# Patient Record
Sex: MALE | Employment: OTHER | URBAN - METROPOLITAN AREA
[De-identification: names, ages, dates, MRNs, and addresses within clinical notes are randomized per-mention and may not be internally consistent; named-entity substitution may affect disease eponyms.]

---

## 2021-05-27 ENCOUNTER — OFFICE VISIT (OUTPATIENT)
Dept: PRIMARY CARE CLINIC | Age: 66
End: 2021-05-27
Payer: MEDICARE

## 2021-05-27 VITALS
HEART RATE: 61 BPM | SYSTOLIC BLOOD PRESSURE: 150 MMHG | BODY MASS INDEX: 29.44 KG/M2 | OXYGEN SATURATION: 97 % | HEIGHT: 69 IN | DIASTOLIC BLOOD PRESSURE: 100 MMHG | WEIGHT: 198.8 LBS

## 2021-05-27 DIAGNOSIS — K40.90 RIGHT INGUINAL HERNIA: Primary | ICD-10-CM

## 2021-05-27 DIAGNOSIS — N28.9 RENAL INSUFFICIENCY: ICD-10-CM

## 2021-05-27 PROCEDURE — 99203 OFFICE O/P NEW LOW 30 MIN: CPT | Performed by: FAMILY MEDICINE

## 2021-05-27 PROCEDURE — 4040F PNEUMOC VAC/ADMIN/RCVD: CPT | Performed by: FAMILY MEDICINE

## 2021-05-27 PROCEDURE — 3017F COLORECTAL CA SCREEN DOC REV: CPT | Performed by: FAMILY MEDICINE

## 2021-05-27 PROCEDURE — 1036F TOBACCO NON-USER: CPT | Performed by: FAMILY MEDICINE

## 2021-05-27 PROCEDURE — G8427 DOCREV CUR MEDS BY ELIG CLIN: HCPCS | Performed by: FAMILY MEDICINE

## 2021-05-27 PROCEDURE — G8417 CALC BMI ABV UP PARAM F/U: HCPCS | Performed by: FAMILY MEDICINE

## 2021-05-27 PROCEDURE — 1123F ACP DISCUSS/DSCN MKR DOCD: CPT | Performed by: FAMILY MEDICINE

## 2021-05-27 RX ORDER — CALCIUM ACETATE 667 MG/1
TABLET ORAL
COMMUNITY
Start: 2020-12-09 | End: 2021-12-16

## 2021-05-27 RX ORDER — ASPIRIN 325 MG
TABLET ORAL
COMMUNITY
Start: 2021-03-17

## 2021-05-27 RX ORDER — SODIUM BICARBONATE 325 MG/1
TABLET ORAL
COMMUNITY
End: 2021-12-16

## 2021-05-27 RX ORDER — CHOLECALCIFEROL (VITAMIN D3) 10(400)/ML
0.5 DROPS ORAL
COMMUNITY
Start: 2020-12-21 | End: 2022-02-24

## 2021-05-27 RX ORDER — HEPARIN SODIUM 10000 [USP'U]/ML
INJECTION, SOLUTION INTRAVENOUS; SUBCUTANEOUS
COMMUNITY
Start: 2021-04-09 | End: 2022-04-08

## 2021-05-27 RX ORDER — CARVEDILOL 12.5 MG/1
TABLET ORAL
COMMUNITY
Start: 2020-10-12 | End: 2021-12-16

## 2021-05-27 RX ORDER — CARVEDILOL 6.25 MG/1
TABLET ORAL
COMMUNITY
Start: 2021-03-03

## 2021-05-27 SDOH — ECONOMIC STABILITY: FOOD INSECURITY: WITHIN THE PAST 12 MONTHS, THE FOOD YOU BOUGHT JUST DIDN'T LAST AND YOU DIDN'T HAVE MONEY TO GET MORE.: NEVER TRUE

## 2021-05-27 SDOH — ECONOMIC STABILITY: FOOD INSECURITY: WITHIN THE PAST 12 MONTHS, YOU WORRIED THAT YOUR FOOD WOULD RUN OUT BEFORE YOU GOT MONEY TO BUY MORE.: NEVER TRUE

## 2021-05-27 ASSESSMENT — ENCOUNTER SYMPTOMS
RESPIRATORY NEGATIVE: 1
EYES NEGATIVE: 1

## 2021-05-27 ASSESSMENT — PATIENT HEALTH QUESTIONNAIRE - PHQ9
SUM OF ALL RESPONSES TO PHQ QUESTIONS 1-9: 0
1. LITTLE INTEREST OR PLEASURE IN DOING THINGS: 0
2. FEELING DOWN, DEPRESSED OR HOPELESS: 0
SUM OF ALL RESPONSES TO PHQ9 QUESTIONS 1 & 2: 0

## 2021-05-27 ASSESSMENT — SOCIAL DETERMINANTS OF HEALTH (SDOH): HOW HARD IS IT FOR YOU TO PAY FOR THE VERY BASICS LIKE FOOD, HOUSING, MEDICAL CARE, AND HEATING?: SOMEWHAT HARD

## 2021-05-27 NOTE — PROGRESS NOTES
Subjective:      Patient ID: Lynda Cardona is a 78 y.o. male. Has a large right hernia needs fixed  Has h/o renal failure sees nephrology and is on dialysis  Has cardiology too      Review of Systems   Constitutional: Negative. HENT: Negative. Eyes: Negative. Respiratory: Negative. Cardiovascular:        No records yet of his issues   Genitourinary:        On dialysis with left arm fistula       Objective:   Physical Exam  Constitutional:       Appearance: Normal appearance. HENT:      Nose: Nose normal.   Eyes:      Pupils: Pupils are equal, round, and reactive to light. Pulmonary:      Effort: Pulmonary effort is normal.   Genitourinary:     Comments: Right hernia  Musculoskeletal:         General: Normal range of motion. Cervical back: Normal range of motion. Neurological:      General: No focal deficit present. Mental Status: He is alert. Psychiatric:         Mood and Affect: Mood normal.         Thought Content: Thought content normal.         Judgment: Judgment normal.         Assessment:      1. Right inguinal hernia    2. Renal insufficiency            Plan:      Gloria was seen today for new patient, hypertension and other.     Diagnoses and all orders for this visit:    Right inguinal hernia  -     Ailyn Knox DO, General Surgery, [place]    Renal insufficiency                Electronically signed by Ermias Benedict MD on 5/27/2021 at 1:52 PM

## 2021-06-18 ENCOUNTER — TELEPHONE (OUTPATIENT)
Dept: PRIMARY CARE CLINIC | Age: 66
End: 2021-06-18

## 2021-07-13 ENCOUNTER — OFFICE VISIT (OUTPATIENT)
Dept: SURGERY | Age: 66
End: 2021-07-13
Payer: MEDICARE

## 2021-07-13 VITALS
HEART RATE: 60 BPM | WEIGHT: 195.4 LBS | DIASTOLIC BLOOD PRESSURE: 99 MMHG | HEIGHT: 69 IN | BODY MASS INDEX: 28.94 KG/M2 | OXYGEN SATURATION: 99 % | SYSTOLIC BLOOD PRESSURE: 166 MMHG

## 2021-07-13 DIAGNOSIS — D47.09 MAST CELL DISORDER: ICD-10-CM

## 2021-07-13 DIAGNOSIS — K40.90 RIGHT INGUINAL HERNIA: Primary | ICD-10-CM

## 2021-07-13 DIAGNOSIS — N18.6 ESRD (END STAGE RENAL DISEASE) ON DIALYSIS (HCC): ICD-10-CM

## 2021-07-13 DIAGNOSIS — Z99.2 ESRD (END STAGE RENAL DISEASE) ON DIALYSIS (HCC): ICD-10-CM

## 2021-07-13 PROCEDURE — 1036F TOBACCO NON-USER: CPT | Performed by: SURGERY

## 2021-07-13 PROCEDURE — G8427 DOCREV CUR MEDS BY ELIG CLIN: HCPCS | Performed by: SURGERY

## 2021-07-13 PROCEDURE — 99203 OFFICE O/P NEW LOW 30 MIN: CPT | Performed by: SURGERY

## 2021-07-13 PROCEDURE — 1123F ACP DISCUSS/DSCN MKR DOCD: CPT | Performed by: SURGERY

## 2021-07-13 PROCEDURE — 3017F COLORECTAL CA SCREEN DOC REV: CPT | Performed by: SURGERY

## 2021-07-13 PROCEDURE — G8417 CALC BMI ABV UP PARAM F/U: HCPCS | Performed by: SURGERY

## 2021-07-13 PROCEDURE — 4040F PNEUMOC VAC/ADMIN/RCVD: CPT | Performed by: SURGERY

## 2021-07-13 NOTE — PROGRESS NOTES
[id number] [address] [facility] Surgery   History & Physical  Yelena Hayward,   Pt Name: Etelvina Cardona  MRN: [id number]  [place]: 1955  Date of evaluation: 7/13/2021  Primary Care Physician: Mark Raman MD    Chief Complaint: right inguinal hernia      SUBJECTIVE:    History of Present Illness: This is a 78 y.o.  male who presents for evaluation for right inguinal hernia, present for ~1yr, slowly growing in size since then, denies testicular pain or symptoms of obstruction, no history of prior repair. PMH is significant for CKD on HT, pt has a history of mast cell disorder but no recent hypersensitivity reactions (pt does not go to [facility] due to the age of the building and allergens present). Pt reports hernia is nontender, spontaneously reduces when supine. Chart review performed to add information to the HPI: Yes    Past Medical History   has a past medical history of Asthma, Chronic kidney disease, and Hypertension. Past Surgical History   has a past surgical history that includes Tonsillectomy; Vasectomy; Dialysis fistula creation; and Prostate surgery. Family History  family history includes Cancer in his father and mother; High Blood Pressure in his mother. Social History  Tobacco use:  reports that he has never smoked. He has never used smokeless tobacco.  Alcohol use:  reports current alcohol use. Drug use:  reports no history of drug use.       Medications  Current Medications:   Current Outpatient Medications   Medication Sig Dispense Refill    sodium chloride 0.9 % SOLN 100 mL with iron sucrose 20 MG/ML SOLN 100 mg      Soap & Cleansers (GRANDPAS BAKING SODA SOAP) BAR baking soda      carvedilol (COREG) 12.5 MG tablet Take by mouth      Heparin Sodium, Porcine, (HEPARIN, PORCINE,) 61934 UNIT/ML irrigation Heparin Sodium (Porcine) 1,000 Units/mL Systemic      sodium bicarbonate 325 MG tablet sodium bicarbonate   1300mg take one tablet twice daily      Cholecalciferol (VITAMIN D) 10 MCG/ML LIQD Take 0.5 mcg by mouth      aspirin 325 MG tablet aspirin 325 mg tablet   Take 1 tablet every day by oral route for 30 days.  calcium acetate (PHOSLO) 667 MG TABS calcium acetate(phosphate binders) 667 mg tablet   Take 2 tablets 3 times a day by oral route.  Calcium Citrate-Vitamin D 200-250 MG-UNIT TABS Take by mouth      vitamin D 25 MCG (1000 UT) CAPS Take 1,000 Units by mouth daily      carvedilol (COREG) 6.25 MG tablet carvedilol 6.25 mg tablet   TAKE ONE TABLET BY MOUTH TWO TIMES A DAY (Patient not taking: Reported on 5/27/2021)       No current facility-administered medications for this visit. Home Medications:   Prior to Admission medications    Medication Sig Start Date End Date Taking? Authorizing Provider   sodium chloride 0.9 % SOLN 100 mL with iron sucrose 20 MG/ML SOLN 100 mg 7/7/21 7/28/21 Yes Historical Provider, MD   Soap & Cleansers (GRANDPAS BAKING SODA SOAP) BAR baking soda 10/29/20  Yes Historical Provider, MD   carvedilol (COREG) 12.5 MG tablet Take by mouth 10/12/20  Yes Historical Provider, MD   Heparin Sodium, Porcine, (HEPARIN, PORCINE,) 52367 UNIT/ML irrigation Heparin Sodium (Porcine) 1,000 Units/mL Systemic 4/9/21 4/8/22 Yes Historical Provider, MD   sodium bicarbonate 325 MG tablet sodium bicarbonate   1300mg take one tablet twice daily   Yes Historical Provider, MD   Cholecalciferol (VITAMIN D) 10 MCG/ML LIQD Take 0.5 mcg by mouth 12/21/20 12/20/21 Yes Historical Provider, MD   aspirin 325 MG tablet aspirin 325 mg tablet   Take 1 tablet every day by oral route for 30 days. 3/17/21  Yes Historical Provider, MD   calcium acetate (PHOSLO) 667 MG TABS calcium acetate(phosphate binders) 667 mg tablet   Take 2 tablets 3 times a day by oral route.  12/9/20  Yes Historical Provider, MD   Calcium Citrate-Vitamin D 200-250 MG-UNIT TABS Take by mouth 10/12/20  Yes Historical Provider, MD   vitamin D 25 MCG (1000 UT) CAPS Take 1,000 Units by mouth daily   Yes masses. Large R inguinal bulge that extends to at least the superior portion of the scrotum, bilateral testes intact, no overlying skin changes, reducible. Musculoskeletal: No evidence of bony/muscular deformities, trauma, atrophy of either left/right upper/lower extremity. No evidence of digital clubbing or cyanosis. Neurologic:  CN 2-12 grossly intact without obvious deficits. Grossly normal sensation in all extremities. Psychiatric: appropriate judgement and insight, appropriate recall of recent and remote memory, no evidence of depression/anxiety/agitation    DIAGNOSES:   Diagnosis Orders   1. Right inguinal hernia     2. ESRD (end stage renal disease) on dialysis ([facility] [address]     3. Mast cell disorder         PLAN:  · We discussed several treatment options including nonoperative management vs operative repair (open vs laparoscopic). Given pt's significant comorbidities, will need to risk stratify the patient in order to administer general anesthetics. If high risk then would consider open repair under minimal sedation and local analgesia. Nonoperative observation is acceptable at this point since pt is asymptomatic. Pt would like to some time to consider his options, he would like to have elective repair sometime in October should he choose surgery. · Will obtain medical/renal clearance in the meantime, plan on having Mr Gabby Randall RTC in October to re-evaluate. We discussed the signs/symptoms of hernia incarceration/strangulation in which case pt should proceed directly to the emergency room   · All questions were answered, pt is agreeable to this plan.   ·       Medical Decision Making: low complexity     Electronically signed by Magnolia Joe DO on 7/13/2021 at 10:06 AM

## 2021-09-21 ENCOUNTER — OFFICE VISIT (OUTPATIENT)
Dept: PRIMARY CARE CLINIC | Age: 66
End: 2021-09-21
Payer: MEDICARE

## 2021-09-21 VITALS
BODY MASS INDEX: 28.58 KG/M2 | DIASTOLIC BLOOD PRESSURE: 88 MMHG | WEIGHT: 193 LBS | HEART RATE: 60 BPM | OXYGEN SATURATION: 97 % | SYSTOLIC BLOOD PRESSURE: 136 MMHG | HEIGHT: 69 IN

## 2021-09-21 DIAGNOSIS — J45.909 REACTIVE AIRWAY DISEASE WITHOUT COMPLICATION, UNSPECIFIED ASTHMA SEVERITY, UNSPECIFIED WHETHER PERSISTENT: Primary | ICD-10-CM

## 2021-09-21 PROCEDURE — 3017F COLORECTAL CA SCREEN DOC REV: CPT | Performed by: FAMILY MEDICINE

## 2021-09-21 PROCEDURE — G8427 DOCREV CUR MEDS BY ELIG CLIN: HCPCS | Performed by: FAMILY MEDICINE

## 2021-09-21 PROCEDURE — 1036F TOBACCO NON-USER: CPT | Performed by: FAMILY MEDICINE

## 2021-09-21 PROCEDURE — G8417 CALC BMI ABV UP PARAM F/U: HCPCS | Performed by: FAMILY MEDICINE

## 2021-09-21 PROCEDURE — 4040F PNEUMOC VAC/ADMIN/RCVD: CPT | Performed by: FAMILY MEDICINE

## 2021-09-21 PROCEDURE — 99213 OFFICE O/P EST LOW 20 MIN: CPT | Performed by: FAMILY MEDICINE

## 2021-09-21 PROCEDURE — 1123F ACP DISCUSS/DSCN MKR DOCD: CPT | Performed by: FAMILY MEDICINE

## 2021-09-21 ASSESSMENT — ENCOUNTER SYMPTOMS: CHEST TIGHTNESS: 1

## 2021-09-21 NOTE — PROGRESS NOTES
Subjective:      Patient ID: August Aditya is a 78 y.o. male. longterm lung issues, has been to allergist and pulm  Lots tried nothing helps  Wants to try anything differrent      Review of Systems   HENT: Positive for congestion. Respiratory: Positive for chest tightness. Cardiovascular: Negative. Gastrointestinal:        H/o hernia   Neurological: Negative. Hematological: Negative. Psychiatric/Behavioral: Negative. All other systems reviewed and are negative. Objective:   Physical Exam  Vitals reviewed. Constitutional:       Appearance: Normal appearance. HENT:      Nose: Congestion present. Eyes:      Pupils: Pupils are equal, round, and reactive to light. Cardiovascular:      Rate and Rhythm: Normal rate and regular rhythm. Pulmonary:      Effort: Pulmonary effort is normal.   Musculoskeletal:         General: Normal range of motion. Cervical back: Normal range of motion. Skin:     General: Skin is warm. Neurological:      General: No focal deficit present. Mental Status: He is alert. Psychiatric:         Mood and Affect: Mood normal.         Assessment:      1. Reactive airway disease without complication, unspecified asthma severity, unspecified whether persistent            Plan:      Savannah Youssef was seen today for follow-up, congestion and cough. Diagnoses and all orders for this visit:    Reactive airway disease without complication, unspecified asthma severity, unspecified whether persistent    Other orders  -     ipratropium (ATROVENT HFA) 17 MCG/ACT inhaler;  Inhale 1 puff into the lungs 3 times daily    he did not recall ever having this med            Electronically signed by Kevin Park MD on 9/21/2021 at 10:34 AM

## 2021-11-05 ENCOUNTER — OFFICE VISIT (OUTPATIENT)
Dept: PRIMARY CARE CLINIC | Age: 66
End: 2021-11-05
Payer: MEDICARE

## 2021-11-05 VITALS
HEART RATE: 85 BPM | SYSTOLIC BLOOD PRESSURE: 182 MMHG | OXYGEN SATURATION: 98 % | WEIGHT: 196 LBS | BODY MASS INDEX: 29.03 KG/M2 | DIASTOLIC BLOOD PRESSURE: 120 MMHG | HEIGHT: 69 IN

## 2021-11-05 DIAGNOSIS — I10 HYPERTENSION, UNSPECIFIED TYPE: Primary | ICD-10-CM

## 2021-11-05 PROCEDURE — 99213 OFFICE O/P EST LOW 20 MIN: CPT | Performed by: FAMILY MEDICINE

## 2021-11-05 PROCEDURE — 1123F ACP DISCUSS/DSCN MKR DOCD: CPT | Performed by: FAMILY MEDICINE

## 2021-11-05 PROCEDURE — 4040F PNEUMOC VAC/ADMIN/RCVD: CPT | Performed by: FAMILY MEDICINE

## 2021-11-05 PROCEDURE — 3017F COLORECTAL CA SCREEN DOC REV: CPT | Performed by: FAMILY MEDICINE

## 2021-11-05 PROCEDURE — 1036F TOBACCO NON-USER: CPT | Performed by: FAMILY MEDICINE

## 2021-11-05 PROCEDURE — G8427 DOCREV CUR MEDS BY ELIG CLIN: HCPCS | Performed by: FAMILY MEDICINE

## 2021-11-05 PROCEDURE — G8417 CALC BMI ABV UP PARAM F/U: HCPCS | Performed by: FAMILY MEDICINE

## 2021-11-05 PROCEDURE — G8484 FLU IMMUNIZE NO ADMIN: HCPCS | Performed by: FAMILY MEDICINE

## 2021-11-05 RX ORDER — SODIUM BICARBONATE 650 MG/1
TABLET ORAL
COMMUNITY
Start: 2021-10-27

## 2021-11-09 ENCOUNTER — TELEPHONE (OUTPATIENT)
Dept: PRIMARY CARE CLINIC | Age: 66
End: 2021-11-09

## 2021-11-09 NOTE — TELEPHONE ENCOUNTER
Pt recently had appt with Dr. Bre Quiñonez and doctor was wanting to know past vanco doses       vanco:    11/30/17  1000 mg     12/2/17 1000 mg

## 2021-12-16 ENCOUNTER — OFFICE VISIT (OUTPATIENT)
Dept: PRIMARY CARE CLINIC | Age: 66
End: 2021-12-16
Payer: MEDICARE

## 2021-12-16 VITALS
BODY MASS INDEX: 29.03 KG/M2 | OXYGEN SATURATION: 100 % | WEIGHT: 196 LBS | SYSTOLIC BLOOD PRESSURE: 168 MMHG | HEIGHT: 69 IN | DIASTOLIC BLOOD PRESSURE: 80 MMHG | HEART RATE: 36 BPM

## 2021-12-16 DIAGNOSIS — J40 BRONCHITIS: Primary | ICD-10-CM

## 2021-12-16 PROBLEM — R79.89 ELEVATED TROPONIN I LEVEL: Status: ACTIVE | Noted: 2021-12-16

## 2021-12-16 PROBLEM — N18.9 ANEMIA IN CHRONIC KIDNEY DISEASE: Status: ACTIVE | Noted: 2020-09-28

## 2021-12-16 PROBLEM — R00.2 PALPITATIONS: Status: ACTIVE | Noted: 2021-12-16

## 2021-12-16 PROBLEM — N41.8: Status: ACTIVE | Noted: 2017-05-03

## 2021-12-16 PROBLEM — D47.09 OTHER MAST CELL NEOPLASMS OF UNCERTAIN BEHAVIOR: Status: ACTIVE | Noted: 2018-06-29

## 2021-12-16 PROBLEM — E78.00 PURE HYPERCHOLESTEROLEMIA, UNSPECIFIED: Status: ACTIVE | Noted: 2018-06-29

## 2021-12-16 PROBLEM — S81.802A UNSPECIFIED OPEN WOUND, LEFT LOWER LEG, INITIAL ENCOUNTER: Status: ACTIVE | Noted: 2017-06-29

## 2021-12-16 PROBLEM — N18.6 STAGE 5 CHRONIC KIDNEY DISEASE ON CHRONIC DIALYSIS (HCC): Status: ACTIVE | Noted: 2020-03-03

## 2021-12-16 PROBLEM — I50.9 HEART FAILURE, UNSPECIFIED (HCC): Status: ACTIVE | Noted: 2018-06-29

## 2021-12-16 PROBLEM — T82.9XXA COMPLICATION OF VASCULAR DIALYSIS CATHETER: Status: ACTIVE | Noted: 2017-05-03

## 2021-12-16 PROBLEM — R06.02 SHORTNESS OF BREATH: Status: ACTIVE | Noted: 2017-05-03

## 2021-12-16 PROBLEM — Z99.2 STAGE 5 CHRONIC KIDNEY DISEASE ON CHRONIC DIALYSIS (HCC): Status: ACTIVE | Noted: 2020-03-03

## 2021-12-16 PROBLEM — N39.0 URINARY TRACT INFECTION, SITE NOT SPECIFIED: Status: ACTIVE | Noted: 2021-12-13

## 2021-12-16 PROBLEM — D63.1 ANEMIA IN CHRONIC KIDNEY DISEASE: Status: ACTIVE | Noted: 2020-09-28

## 2021-12-16 PROBLEM — R53.83 OTHER FATIGUE: Status: ACTIVE | Noted: 2018-07-24

## 2021-12-16 PROBLEM — R07.9 CHEST PAIN: Status: ACTIVE | Noted: 2017-05-03

## 2021-12-16 PROBLEM — E83.50 UNSPECIFIED DISORDER OF CALCIUM METABOLISM: Status: ACTIVE | Noted: 2017-10-20

## 2021-12-16 PROBLEM — T88.6XXA ANAPHYLACTIC REACTION DUE TO ADVERSE EFFECT OF CORRECT DRUG OR MEDICAMENT PROPERLY ADMINISTERED, INITIAL ENCOUNTER: Status: ACTIVE | Noted: 2020-10-05

## 2021-12-16 PROBLEM — E87.70 FLUID OVERLOAD, UNSPECIFIED: Status: ACTIVE | Noted: 2017-05-03

## 2021-12-16 PROBLEM — R52 PAIN, UNSPECIFIED: Status: ACTIVE | Noted: 2017-05-03

## 2021-12-16 PROBLEM — R77.8 ELEVATED TROPONIN I LEVEL: Status: ACTIVE | Noted: 2021-12-16

## 2021-12-16 PROBLEM — D68.9 COAGULATION DEFECT, UNSPECIFIED (HCC): Status: ACTIVE | Noted: 2017-05-03

## 2021-12-16 PROBLEM — E88.9 METABOLIC DISORDER, UNSPECIFIED: Status: ACTIVE | Noted: 2017-05-03

## 2021-12-16 PROBLEM — N25.81 SECONDARY HYPERPARATHYROIDISM OF RENAL ORIGIN (HCC): Status: ACTIVE | Noted: 2017-05-03

## 2021-12-16 PROBLEM — J45.909 UNSPECIFIED ASTHMA, UNCOMPLICATED: Status: ACTIVE | Noted: 2018-06-29

## 2021-12-16 PROBLEM — I15.9 SECONDARY HYPERTENSION, UNSPECIFIED: Status: ACTIVE | Noted: 2018-06-29

## 2021-12-16 PROBLEM — I48.92 ATRIAL FLUTTER (HCC): Status: ACTIVE | Noted: 2021-12-16

## 2021-12-16 PROBLEM — I87.1 COMPRESSION OF VEIN: Status: ACTIVE | Noted: 2018-06-29

## 2021-12-16 PROBLEM — D50.9 IRON DEFICIENCY ANEMIA, UNSPECIFIED: Status: ACTIVE | Noted: 2017-05-03

## 2021-12-16 PROBLEM — E87.5 HYPERKALEMIA: Status: ACTIVE | Noted: 2017-05-03

## 2021-12-16 PROBLEM — R50.9 FEVER, UNSPECIFIED: Status: ACTIVE | Noted: 2017-05-03

## 2021-12-16 PROCEDURE — 1036F TOBACCO NON-USER: CPT | Performed by: FAMILY MEDICINE

## 2021-12-16 PROCEDURE — 4040F PNEUMOC VAC/ADMIN/RCVD: CPT | Performed by: FAMILY MEDICINE

## 2021-12-16 PROCEDURE — G8427 DOCREV CUR MEDS BY ELIG CLIN: HCPCS | Performed by: FAMILY MEDICINE

## 2021-12-16 PROCEDURE — G8417 CALC BMI ABV UP PARAM F/U: HCPCS | Performed by: FAMILY MEDICINE

## 2021-12-16 PROCEDURE — G8484 FLU IMMUNIZE NO ADMIN: HCPCS | Performed by: FAMILY MEDICINE

## 2021-12-16 PROCEDURE — 99213 OFFICE O/P EST LOW 20 MIN: CPT | Performed by: FAMILY MEDICINE

## 2021-12-16 PROCEDURE — 1123F ACP DISCUSS/DSCN MKR DOCD: CPT | Performed by: FAMILY MEDICINE

## 2021-12-16 PROCEDURE — 3017F COLORECTAL CA SCREEN DOC REV: CPT | Performed by: FAMILY MEDICINE

## 2021-12-16 RX ORDER — CIPROFLOXACIN 500 MG/1
TABLET, FILM COATED ORAL
COMMUNITY
Start: 2021-12-13 | End: 2022-01-13 | Stop reason: SDUPTHER

## 2021-12-16 ASSESSMENT — PATIENT HEALTH QUESTIONNAIRE - PHQ9
SUM OF ALL RESPONSES TO PHQ9 QUESTIONS 1 & 2: 0
2. FEELING DOWN, DEPRESSED OR HOPELESS: 0
1. LITTLE INTEREST OR PLEASURE IN DOING THINGS: 0
SUM OF ALL RESPONSES TO PHQ QUESTIONS 1-9: 0

## 2021-12-23 ENCOUNTER — OFFICE VISIT (OUTPATIENT)
Dept: PRIMARY CARE CLINIC | Age: 66
End: 2021-12-23
Payer: MEDICARE

## 2021-12-23 VITALS — OXYGEN SATURATION: 98 % | SYSTOLIC BLOOD PRESSURE: 148 MMHG | DIASTOLIC BLOOD PRESSURE: 80 MMHG | HEART RATE: 46 BPM

## 2021-12-23 DIAGNOSIS — J18.9 PNEUMONIA DUE TO INFECTIOUS ORGANISM, UNSPECIFIED LATERALITY, UNSPECIFIED PART OF LUNG: Primary | ICD-10-CM

## 2021-12-23 PROCEDURE — G8427 DOCREV CUR MEDS BY ELIG CLIN: HCPCS | Performed by: FAMILY MEDICINE

## 2021-12-23 PROCEDURE — 99213 OFFICE O/P EST LOW 20 MIN: CPT | Performed by: FAMILY MEDICINE

## 2021-12-23 PROCEDURE — 3017F COLORECTAL CA SCREEN DOC REV: CPT | Performed by: FAMILY MEDICINE

## 2021-12-23 PROCEDURE — G8484 FLU IMMUNIZE NO ADMIN: HCPCS | Performed by: FAMILY MEDICINE

## 2021-12-23 PROCEDURE — G8417 CALC BMI ABV UP PARAM F/U: HCPCS | Performed by: FAMILY MEDICINE

## 2021-12-23 PROCEDURE — 1036F TOBACCO NON-USER: CPT | Performed by: FAMILY MEDICINE

## 2021-12-23 PROCEDURE — 1123F ACP DISCUSS/DSCN MKR DOCD: CPT | Performed by: FAMILY MEDICINE

## 2021-12-23 PROCEDURE — 4040F PNEUMOC VAC/ADMIN/RCVD: CPT | Performed by: FAMILY MEDICINE

## 2021-12-23 RX ORDER — LEVOFLOXACIN 500 MG/1
500 TABLET, FILM COATED ORAL DAILY
Qty: 7 TABLET | Refills: 0 | Status: SHIPPED | OUTPATIENT
Start: 2021-12-23 | End: 2021-12-30 | Stop reason: SINTOL

## 2021-12-23 ASSESSMENT — PATIENT HEALTH QUESTIONNAIRE - PHQ9
SUM OF ALL RESPONSES TO PHQ QUESTIONS 1-9: 0
SUM OF ALL RESPONSES TO PHQ QUESTIONS 1-9: 0
SUM OF ALL RESPONSES TO PHQ9 QUESTIONS 1 & 2: 0
SUM OF ALL RESPONSES TO PHQ QUESTIONS 1-9: 0
1. LITTLE INTEREST OR PLEASURE IN DOING THINGS: 0
2. FEELING DOWN, DEPRESSED OR HOPELESS: 0

## 2021-12-23 ASSESSMENT — ENCOUNTER SYMPTOMS: CHEST TIGHTNESS: 1

## 2021-12-23 NOTE — PROGRESS NOTES
Subjective:      Patient ID: Sherie Sung is a 78 y.o. male. Believes he may be infected      Review of Systems   Constitutional: Negative. Respiratory: Positive for chest tightness. All other systems reviewed and are negative. Objective:   Physical Exam  Vitals reviewed. Constitutional:       Appearance: Normal appearance. Cardiovascular:      Rate and Rhythm: Normal rate. Pulmonary:      Effort: Respiratory distress present. Neurological:      General: No focal deficit present. Mental Status: He is alert. Psychiatric:         Mood and Affect: Mood normal.         Thought Content: Thought content normal.         Assessment:      1. Pneumonia due to infectious organism, unspecified laterality, unspecified part of lung            Plan:      Selvin Ellison was seen today for other. Diagnoses and all orders for this visit:    Pneumonia due to infectious organism, unspecified laterality, unspecified part of lung  -     Culture, Respiratory; Future    Other orders  -     levoFLOXacin (LEVAQUIN) 500 MG tablet;  Take 1 tablet by mouth daily for 7 days                Electronically signed by Ramakrishna Mireles MD on 12/23/2021 at 3:21 PM

## 2021-12-27 ENCOUNTER — HOSPITAL ENCOUNTER (OUTPATIENT)
Age: 66
Setting detail: SPECIMEN
Discharge: HOME | End: 2021-12-27

## 2021-12-27 DIAGNOSIS — J18.9 PNEUMONIA DUE TO INFECTIOUS ORGANISM, UNSPECIFIED LATERALITY, UNSPECIFIED PART OF LUNG: ICD-10-CM

## 2021-12-28 LAB
CULTURE: ABNORMAL
DIRECT EXAM: ABNORMAL
Lab: ABNORMAL
SPECIMEN DESCRIPTION: ABNORMAL

## 2021-12-29 ASSESSMENT — ENCOUNTER SYMPTOMS: CHEST TIGHTNESS: 1

## 2021-12-29 NOTE — PROGRESS NOTES
Subjective:      Patient ID: Brittaney Soto is a 78 y.o. male. Says today is a good day but he is still concerned about his chest      Review of Systems   Respiratory: Positive for chest tightness. Musculoskeletal: Negative. Neurological: Negative. Psychiatric/Behavioral: Negative. All other systems reviewed and are negative. Objective:   Physical Exam  Constitutional:       Appearance: Normal appearance. HENT:      Head: Normocephalic. Mouth/Throat:      Mouth: Mucous membranes are moist.   Pulmonary:      Breath sounds: Wheezing and rales present. Musculoskeletal:         General: Normal range of motion. Neurological:      Mental Status: He is alert. Psychiatric:         Mood and Affect: Mood normal.         Thought Content: Thought content normal.         Assessment:      1. Bronchitis            Plan:      Evita Pete was seen today for chest pain.     Diagnoses and all orders for this visit:    Bronchitis    tells me he has never been offered a sputum culture  Is on dialysis and BP always difficult            Electronically signed by Monty Stephens MD on 12/16/2021 at 10:55 AM

## 2021-12-30 ENCOUNTER — TELEPHONE (OUTPATIENT)
Dept: PRIMARY CARE CLINIC | Age: 66
End: 2021-12-30

## 2021-12-30 RX ORDER — CIPROFLOXACIN 500 MG/1
500 TABLET, FILM COATED ORAL 2 TIMES DAILY
Qty: 20 TABLET | Refills: 0 | Status: SHIPPED | OUTPATIENT
Start: 2021-12-30 | End: 2022-01-09

## 2021-12-30 NOTE — TELEPHONE ENCOUNTER
Patient feels that the Levofloxacin is causing issues- restless legs, leg and knee pain, jumpy legs, unable to sleep, heart palpatations. Dropped to knees during dialysis this morning Requesting cipro instead. Tolerates that better.  Please advise patient when sent over

## 2022-01-13 ENCOUNTER — OFFICE VISIT (OUTPATIENT)
Dept: PRIMARY CARE CLINIC | Age: 67
End: 2022-01-13
Payer: MEDICARE

## 2022-01-13 VITALS
WEIGHT: 192 LBS | HEART RATE: 72 BPM | DIASTOLIC BLOOD PRESSURE: 100 MMHG | SYSTOLIC BLOOD PRESSURE: 150 MMHG | HEIGHT: 69 IN | BODY MASS INDEX: 28.44 KG/M2

## 2022-01-13 DIAGNOSIS — J18.9 ATYPICAL PNEUMONIA: Primary | ICD-10-CM

## 2022-01-13 PROCEDURE — 4040F PNEUMOC VAC/ADMIN/RCVD: CPT | Performed by: FAMILY MEDICINE

## 2022-01-13 PROCEDURE — G8417 CALC BMI ABV UP PARAM F/U: HCPCS | Performed by: FAMILY MEDICINE

## 2022-01-13 PROCEDURE — G8427 DOCREV CUR MEDS BY ELIG CLIN: HCPCS | Performed by: FAMILY MEDICINE

## 2022-01-13 PROCEDURE — 1123F ACP DISCUSS/DSCN MKR DOCD: CPT | Performed by: FAMILY MEDICINE

## 2022-01-13 PROCEDURE — G8484 FLU IMMUNIZE NO ADMIN: HCPCS | Performed by: FAMILY MEDICINE

## 2022-01-13 PROCEDURE — 99213 OFFICE O/P EST LOW 20 MIN: CPT | Performed by: FAMILY MEDICINE

## 2022-01-13 PROCEDURE — 1036F TOBACCO NON-USER: CPT | Performed by: FAMILY MEDICINE

## 2022-01-13 PROCEDURE — 3017F COLORECTAL CA SCREEN DOC REV: CPT | Performed by: FAMILY MEDICINE

## 2022-01-13 RX ORDER — CIPROFLOXACIN 500 MG/1
TABLET, FILM COATED ORAL
Qty: 14 TABLET | Refills: 1 | Status: SHIPPED | OUTPATIENT
Start: 2022-01-13 | End: 2022-02-24 | Stop reason: SDUPTHER

## 2022-01-13 RX ORDER — METOPROLOL TARTRATE 100 MG/1
TABLET ORAL
COMMUNITY

## 2022-01-13 RX ORDER — PSYLLIUM HUSK 0.4 G
CAPSULE ORAL
COMMUNITY

## 2022-01-18 ASSESSMENT — ENCOUNTER SYMPTOMS: CHEST TIGHTNESS: 1

## 2022-01-18 NOTE — PROGRESS NOTES
Subjective:      Patient ID: Medina Amezcua is a 78 y.o. male. Following up on his lung issues      Review of Systems   Constitutional: Negative. Respiratory: Positive for chest tightness. Psychiatric/Behavioral: Negative. All other systems reviewed and are negative. Objective:   Physical Exam  Constitutional:       Appearance: Normal appearance. HENT:      Head: Normocephalic. Right Ear: Tympanic membrane normal.      Left Ear: Tympanic membrane normal.   Pulmonary:      Effort: Pulmonary effort is normal.   Musculoskeletal:         General: Normal range of motion. Skin:     General: Skin is warm. Neurological:      Mental Status: He is alert. Psychiatric:         Mood and Affect: Mood normal.         Assessment:      1. Atypical pneumonia            Plan:      Kia Son was seen today for other and discuss labs.     Diagnoses and all orders for this visit:    Atypical pneumonia    Other orders  -     ciprofloxacin (CIPRO) 500 MG tablet; TAKE ONE TABLET BY MOUTH ONCE DAILY FOR 7 DAYS                Electronically signed by Kye Conrad MD on 1/13/2022 at 12:50 PM

## 2022-01-21 ENCOUNTER — TELEPHONE (OUTPATIENT)
Dept: PRIMARY CARE CLINIC | Age: 67
End: 2022-01-21

## 2022-01-21 NOTE — TELEPHONE ENCOUNTER
Patient is requesting iv antibiotic for his lung issue please notify Meredith @ [facility] in [place] [address] [place] [phone] of which antibiotic you prefer him to have. Please advise patient as well.

## 2022-02-02 ENCOUNTER — OFFICE VISIT (OUTPATIENT)
Dept: PRIMARY CARE CLINIC | Age: 67
End: 2022-02-02
Payer: MEDICARE

## 2022-02-02 VITALS — BODY MASS INDEX: 29.51 KG/M2 | WEIGHT: 199.2 LBS | HEIGHT: 69 IN | HEART RATE: 34 BPM | OXYGEN SATURATION: 98 %

## 2022-02-02 DIAGNOSIS — N28.9 RENAL INSUFFICIENCY: Primary | ICD-10-CM

## 2022-02-02 PROCEDURE — G8484 FLU IMMUNIZE NO ADMIN: HCPCS | Performed by: FAMILY MEDICINE

## 2022-02-02 PROCEDURE — G8427 DOCREV CUR MEDS BY ELIG CLIN: HCPCS | Performed by: FAMILY MEDICINE

## 2022-02-02 PROCEDURE — 3017F COLORECTAL CA SCREEN DOC REV: CPT | Performed by: FAMILY MEDICINE

## 2022-02-02 PROCEDURE — 4040F PNEUMOC VAC/ADMIN/RCVD: CPT | Performed by: FAMILY MEDICINE

## 2022-02-02 PROCEDURE — 99213 OFFICE O/P EST LOW 20 MIN: CPT | Performed by: FAMILY MEDICINE

## 2022-02-02 PROCEDURE — 1036F TOBACCO NON-USER: CPT | Performed by: FAMILY MEDICINE

## 2022-02-02 PROCEDURE — G8417 CALC BMI ABV UP PARAM F/U: HCPCS | Performed by: FAMILY MEDICINE

## 2022-02-02 PROCEDURE — 1123F ACP DISCUSS/DSCN MKR DOCD: CPT | Performed by: FAMILY MEDICINE

## 2022-02-02 ASSESSMENT — PATIENT HEALTH QUESTIONNAIRE - PHQ9
SUM OF ALL RESPONSES TO PHQ9 QUESTIONS 1 & 2: 2
2. FEELING DOWN, DEPRESSED OR HOPELESS: 1
SUM OF ALL RESPONSES TO PHQ QUESTIONS 1-9: 2
1. LITTLE INTEREST OR PLEASURE IN DOING THINGS: 1

## 2022-02-24 ENCOUNTER — OFFICE VISIT (OUTPATIENT)
Dept: PRIMARY CARE CLINIC | Age: 67
End: 2022-02-24
Payer: MEDICARE

## 2022-02-24 VITALS
SYSTOLIC BLOOD PRESSURE: 140 MMHG | HEIGHT: 69 IN | BODY MASS INDEX: 29.51 KG/M2 | OXYGEN SATURATION: 99 % | HEART RATE: 57 BPM | WEIGHT: 199.2 LBS | DIASTOLIC BLOOD PRESSURE: 96 MMHG

## 2022-02-24 DIAGNOSIS — N18.6 END-STAGE RENAL DISEASE (HCC): Primary | ICD-10-CM

## 2022-02-24 PROCEDURE — G8417 CALC BMI ABV UP PARAM F/U: HCPCS | Performed by: FAMILY MEDICINE

## 2022-02-24 PROCEDURE — 4040F PNEUMOC VAC/ADMIN/RCVD: CPT | Performed by: FAMILY MEDICINE

## 2022-02-24 PROCEDURE — G8484 FLU IMMUNIZE NO ADMIN: HCPCS | Performed by: FAMILY MEDICINE

## 2022-02-24 PROCEDURE — G8427 DOCREV CUR MEDS BY ELIG CLIN: HCPCS | Performed by: FAMILY MEDICINE

## 2022-02-24 PROCEDURE — 3017F COLORECTAL CA SCREEN DOC REV: CPT | Performed by: FAMILY MEDICINE

## 2022-02-24 PROCEDURE — 99213 OFFICE O/P EST LOW 20 MIN: CPT | Performed by: FAMILY MEDICINE

## 2022-02-24 PROCEDURE — 1036F TOBACCO NON-USER: CPT | Performed by: FAMILY MEDICINE

## 2022-02-24 PROCEDURE — 1123F ACP DISCUSS/DSCN MKR DOCD: CPT | Performed by: FAMILY MEDICINE

## 2022-02-24 RX ORDER — CIPROFLOXACIN 500 MG/1
TABLET, FILM COATED ORAL
Qty: 30 TABLET | Refills: 1 | Status: SHIPPED | OUTPATIENT
Start: 2022-02-24 | End: 2022-02-28

## 2022-02-24 RX ORDER — CIPROFLOXACIN 500 MG/1
TABLET, FILM COATED ORAL
Qty: 14 TABLET | Refills: 1 | Status: CANCELLED | OUTPATIENT
Start: 2022-02-24

## 2022-02-24 ASSESSMENT — PATIENT HEALTH QUESTIONNAIRE - PHQ9
SUM OF ALL RESPONSES TO PHQ9 QUESTIONS 1 & 2: 2
SUM OF ALL RESPONSES TO PHQ QUESTIONS 1-9: 2
2. FEELING DOWN, DEPRESSED OR HOPELESS: 1
1. LITTLE INTEREST OR PLEASURE IN DOING THINGS: 1
SUM OF ALL RESPONSES TO PHQ QUESTIONS 1-9: 2

## 2022-02-24 ASSESSMENT — ENCOUNTER SYMPTOMS
RESPIRATORY NEGATIVE: 1
EYES NEGATIVE: 1
GASTROINTESTINAL NEGATIVE: 1

## 2022-02-24 NOTE — PROGRESS NOTES
Subjective:      Patient ID: Bianca Winters is a 75 y.o. male. Right hernia  Has a pice of tubing on his arm to check for reaction  Needs more cipro to have on hand      Review of Systems   Constitutional: Negative. HENT: Negative. Eyes: Negative. Respiratory: Negative. Cardiovascular: Negative. Gastrointestinal: Negative. Endocrine: Negative. Musculoskeletal: Negative. Neurological: Negative. Psychiatric/Behavioral: Negative. All other systems reviewed and are negative. Objective:   Physical Exam  Vitals reviewed. Constitutional:       Appearance: Normal appearance. Musculoskeletal:         General: Normal range of motion. Psychiatric:         Mood and Affect: Mood normal.         Thought Content: Thought content normal.         Assessment:      No diagnosis found. Plan:      There are no diagnoses linked to this encounter.             Electronically signed by Duane Citron, MD on 2/24/2022 at 2:32 PM

## 2022-02-28 ENCOUNTER — TELEPHONE (OUTPATIENT)
Dept: PRIMARY CARE CLINIC | Age: 67
End: 2022-02-28

## 2022-02-28 RX ORDER — CIPROFLOXACIN 500 MG/1
TABLET, FILM COATED ORAL
Qty: 14 TABLET | Refills: 0 | Status: SHIPPED | OUTPATIENT
Start: 2022-02-28 | End: 2022-03-15 | Stop reason: SDUPTHER

## 2022-02-28 NOTE — TELEPHONE ENCOUNTER
----- Message from Alia De León sent at 2/28/2022  9:43 AM EST -----  Subject: Medication Problem    QUESTIONS  Name of Medication? ciprofloxacin (CIPRO) 500 MG tablet  Patient-reported dosage and instructions? 500 mg   What question or problem do you have with the medication? Pharmacy states   they are unable to fill it due to being written wrong. Please resend   corrected prescription  Preferred Pharmacy? [address], [place]ashonda Cisneros [address] [place] [address]  Pharmacy phone number (if available)? [phone]  Additional Information for Provider?   ---------------------------------------------------------------------------  --------------  [address]  What is the best way for the office to contact you? OK to leave message on   voicemail  Preferred Call Back Phone Number? [phone]  ---------------------------------------------------------------------------  --------------  SCRIPT ANSWERS  Relationship to Patient?  Self

## 2022-03-15 ENCOUNTER — OFFICE VISIT (OUTPATIENT)
Dept: PRIMARY CARE CLINIC | Age: 67
End: 2022-03-15
Payer: MEDICARE

## 2022-03-15 VITALS
WEIGHT: 198 LBS | OXYGEN SATURATION: 99 % | SYSTOLIC BLOOD PRESSURE: 140 MMHG | DIASTOLIC BLOOD PRESSURE: 100 MMHG | HEART RATE: 54 BPM | HEIGHT: 69 IN | BODY MASS INDEX: 29.33 KG/M2

## 2022-03-15 DIAGNOSIS — R53.81 MALAISE: ICD-10-CM

## 2022-03-15 DIAGNOSIS — K40.90 NON-RECURRENT UNILATERAL INGUINAL HERNIA WITHOUT OBSTRUCTION OR GANGRENE: Primary | ICD-10-CM

## 2022-03-15 PROCEDURE — G8417 CALC BMI ABV UP PARAM F/U: HCPCS | Performed by: FAMILY MEDICINE

## 2022-03-15 PROCEDURE — G8427 DOCREV CUR MEDS BY ELIG CLIN: HCPCS | Performed by: FAMILY MEDICINE

## 2022-03-15 PROCEDURE — 99213 OFFICE O/P EST LOW 20 MIN: CPT | Performed by: FAMILY MEDICINE

## 2022-03-15 PROCEDURE — 3017F COLORECTAL CA SCREEN DOC REV: CPT | Performed by: FAMILY MEDICINE

## 2022-03-15 PROCEDURE — 4040F PNEUMOC VAC/ADMIN/RCVD: CPT | Performed by: FAMILY MEDICINE

## 2022-03-15 PROCEDURE — 1123F ACP DISCUSS/DSCN MKR DOCD: CPT | Performed by: FAMILY MEDICINE

## 2022-03-15 PROCEDURE — 1036F TOBACCO NON-USER: CPT | Performed by: FAMILY MEDICINE

## 2022-03-15 PROCEDURE — G8484 FLU IMMUNIZE NO ADMIN: HCPCS | Performed by: FAMILY MEDICINE

## 2022-03-15 RX ORDER — CIPROFLOXACIN 500 MG/1
TABLET, FILM COATED ORAL
Qty: 30 TABLET | Refills: 2 | Status: SHIPPED | OUTPATIENT
Start: 2022-03-15

## 2022-03-15 ASSESSMENT — PATIENT HEALTH QUESTIONNAIRE - PHQ9
SUM OF ALL RESPONSES TO PHQ QUESTIONS 1-9: 0
SUM OF ALL RESPONSES TO PHQ QUESTIONS 1-9: 0
2. FEELING DOWN, DEPRESSED OR HOPELESS: 0
SUM OF ALL RESPONSES TO PHQ9 QUESTIONS 1 & 2: 0
SUM OF ALL RESPONSES TO PHQ QUESTIONS 1-9: 0
SUM OF ALL RESPONSES TO PHQ QUESTIONS 1-9: 0
1. LITTLE INTEREST OR PLEASURE IN DOING THINGS: 0

## 2022-03-15 ASSESSMENT — ENCOUNTER SYMPTOMS
RESPIRATORY NEGATIVE: 1
EYES NEGATIVE: 1
GASTROINTESTINAL NEGATIVE: 1

## 2022-03-15 NOTE — PROGRESS NOTES
Subjective:      Patient ID: Anai Beck is a 75 y.o. male. cipro seems to help with multiple issues  Surgery soon on fistula  Needs surgeon for hernia      Review of Systems   Constitutional: Negative. HENT: Negative. Eyes: Negative. Respiratory: Negative. Cardiovascular: Negative. Gastrointestinal: Negative. Musculoskeletal: Negative. Neurological: Negative. Hematological: Negative. Psychiatric/Behavioral: Negative. All other systems reviewed and are negative. Objective:   Physical Exam  Vitals reviewed. Constitutional:       Appearance: Normal appearance. HENT:      Head: Normocephalic. Mouth/Throat:      Mouth: Mucous membranes are moist.   Cardiovascular:      Rate and Rhythm: Normal rate. Musculoskeletal:         General: Normal range of motion. Cervical back: Normal range of motion. Neurological:      Mental Status: He is alert. Psychiatric:         Mood and Affect: Mood normal.         Thought Content: Thought content normal.         Assessment:      1. Non-recurrent unilateral inguinal hernia without obstruction or gangrene    2. Malaise            Plan:      Mayra Newton was seen today for medication refill, hernia and other.     Diagnoses and all orders for this visit:    Non-recurrent unilateral inguinal hernia without obstruction or gangrene  -     AFL - Mac Asher MD, General Surgery, [facility]    Other orders  -     ciprofloxacin (CIPRO) 500 MG tablet; TAKE 1 TABLET BY MOUTH ONCE DAILY FOR 30 days                Electronically signed by Devante Horta MD on 3/15/2022 at 11:03 AM

## 2023-07-24 ENCOUNTER — CLINICAL DOCUMENTATION ONLY (OUTPATIENT)
Facility: CLINIC | Age: 68
End: 2023-07-24

## 2024-04-21 ENCOUNTER — HOSPITAL ENCOUNTER (OUTPATIENT)
Age: 69
Setting detail: OBSERVATION
Discharge: HOSPICE | End: 2024-04-23
Attending: EMERGENCY MEDICINE | Admitting: STUDENT IN AN ORGANIZED HEALTH CARE EDUCATION/TRAINING PROGRAM
Payer: MEDICARE

## 2024-04-21 DIAGNOSIS — M25.562 CHRONIC PAIN OF LEFT KNEE: ICD-10-CM

## 2024-04-21 DIAGNOSIS — Z51.5 ENCOUNTER FOR ADMISSION TO HOSPICE CARE: Primary | ICD-10-CM

## 2024-04-21 DIAGNOSIS — G89.29 CHRONIC PAIN OF LEFT KNEE: ICD-10-CM

## 2024-04-21 LAB
BACTERIA URNS QL MICRO: ABNORMAL
BILIRUB UR QL STRIP: NEGATIVE
CHARACTER UR: ABNORMAL
CLARITY UR: CLEAR
COLOR UR: YELLOW
EPI CELLS #/AREA URNS HPF: ABNORMAL /HPF (ref 0–5)
GLUCOSE UR STRIP-MCNC: ABNORMAL MG/DL
HGB UR QL STRIP.AUTO: ABNORMAL
KETONES UR STRIP-MCNC: NEGATIVE MG/DL
LEUKOCYTE ESTERASE UR QL STRIP: NEGATIVE
NITRITE UR QL STRIP: NEGATIVE
PH UR STRIP: 6 [PH] (ref 5–8)
PROT UR STRIP-MCNC: ABNORMAL MG/DL
RBC #/AREA URNS HPF: ABNORMAL /HPF (ref 0–2)
SP GR UR STRIP: 1.02 (ref 1–1.03)
UROBILINOGEN UR STRIP-ACNC: NORMAL EU/DL (ref 0–1)
WBC #/AREA URNS HPF: ABNORMAL /HPF (ref 0–5)

## 2024-04-21 PROCEDURE — G0378 HOSPITAL OBSERVATION PER HR: HCPCS

## 2024-04-21 PROCEDURE — 96376 TX/PRO/DX INJ SAME DRUG ADON: CPT

## 2024-04-21 PROCEDURE — 96374 THER/PROPH/DIAG INJ IV PUSH: CPT

## 2024-04-21 PROCEDURE — 6360000002 HC RX W HCPCS: Performed by: STUDENT IN AN ORGANIZED HEALTH CARE EDUCATION/TRAINING PROGRAM

## 2024-04-21 PROCEDURE — 6370000000 HC RX 637 (ALT 250 FOR IP): Performed by: STUDENT IN AN ORGANIZED HEALTH CARE EDUCATION/TRAINING PROGRAM

## 2024-04-21 PROCEDURE — 6360000002 HC RX W HCPCS: Performed by: NURSE PRACTITIONER

## 2024-04-21 PROCEDURE — 81001 URINALYSIS AUTO W/SCOPE: CPT

## 2024-04-21 PROCEDURE — 99221 1ST HOSP IP/OBS SF/LOW 40: CPT | Performed by: STUDENT IN AN ORGANIZED HEALTH CARE EDUCATION/TRAINING PROGRAM

## 2024-04-21 PROCEDURE — 2580000003 HC RX 258: Performed by: STUDENT IN AN ORGANIZED HEALTH CARE EDUCATION/TRAINING PROGRAM

## 2024-04-21 PROCEDURE — 96375 TX/PRO/DX INJ NEW DRUG ADDON: CPT

## 2024-04-21 PROCEDURE — 99285 EMERGENCY DEPT VISIT HI MDM: CPT

## 2024-04-21 RX ORDER — LORAZEPAM 2 MG/ML
1 INJECTION INTRAMUSCULAR EVERY 4 HOURS PRN
Status: DISCONTINUED | OUTPATIENT
Start: 2024-04-21 | End: 2024-04-23 | Stop reason: HOSPADM

## 2024-04-21 RX ORDER — POTASSIUM CHLORIDE 7.45 MG/ML
10 INJECTION INTRAVENOUS PRN
Status: DISCONTINUED | OUTPATIENT
Start: 2024-04-21 | End: 2024-04-23 | Stop reason: HOSPADM

## 2024-04-21 RX ORDER — MORPHINE SULFATE 4 MG/ML
4 INJECTION, SOLUTION INTRAMUSCULAR; INTRAVENOUS EVERY 4 HOURS PRN
Status: DISCONTINUED | OUTPATIENT
Start: 2024-04-21 | End: 2024-04-23 | Stop reason: HOSPADM

## 2024-04-21 RX ORDER — POTASSIUM CHLORIDE 20 MEQ/1
40 TABLET, EXTENDED RELEASE ORAL PRN
Status: DISCONTINUED | OUTPATIENT
Start: 2024-04-21 | End: 2024-04-23 | Stop reason: HOSPADM

## 2024-04-21 RX ORDER — ACETAMINOPHEN 650 MG/1
650 SUPPOSITORY RECTAL EVERY 6 HOURS PRN
Status: DISCONTINUED | OUTPATIENT
Start: 2024-04-21 | End: 2024-04-23 | Stop reason: HOSPADM

## 2024-04-21 RX ORDER — ACETAMINOPHEN 325 MG/1
650 TABLET ORAL EVERY 6 HOURS PRN
Status: DISCONTINUED | OUTPATIENT
Start: 2024-04-21 | End: 2024-04-23 | Stop reason: HOSPADM

## 2024-04-21 RX ORDER — ONDANSETRON 2 MG/ML
4 INJECTION INTRAMUSCULAR; INTRAVENOUS EVERY 6 HOURS PRN
Status: DISCONTINUED | OUTPATIENT
Start: 2024-04-21 | End: 2024-04-23 | Stop reason: HOSPADM

## 2024-04-21 RX ORDER — MORPHINE SULFATE 15 MG/1
15 TABLET ORAL 2 TIMES DAILY
Status: DISCONTINUED | OUTPATIENT
Start: 2024-04-21 | End: 2024-04-23 | Stop reason: HOSPADM

## 2024-04-21 RX ORDER — ONDANSETRON 4 MG/1
4 TABLET, ORALLY DISINTEGRATING ORAL EVERY 8 HOURS PRN
Status: DISCONTINUED | OUTPATIENT
Start: 2024-04-21 | End: 2024-04-23 | Stop reason: HOSPADM

## 2024-04-21 RX ORDER — SODIUM CHLORIDE 0.9 % (FLUSH) 0.9 %
5-40 SYRINGE (ML) INJECTION EVERY 12 HOURS SCHEDULED
Status: DISCONTINUED | OUTPATIENT
Start: 2024-04-21 | End: 2024-04-23 | Stop reason: HOSPADM

## 2024-04-21 RX ORDER — MORPHINE SULFATE 15 MG/1
15 TABLET ORAL 2 TIMES DAILY
COMMUNITY

## 2024-04-21 RX ORDER — POLYETHYLENE GLYCOL 3350 17 G/17G
17 POWDER, FOR SOLUTION ORAL DAILY PRN
Status: DISCONTINUED | OUTPATIENT
Start: 2024-04-21 | End: 2024-04-23 | Stop reason: HOSPADM

## 2024-04-21 RX ORDER — ENOXAPARIN SODIUM 100 MG/ML
40 INJECTION SUBCUTANEOUS DAILY
Status: DISCONTINUED | OUTPATIENT
Start: 2024-04-21 | End: 2024-04-21

## 2024-04-21 RX ORDER — MAGNESIUM SULFATE IN WATER 40 MG/ML
2000 INJECTION, SOLUTION INTRAVENOUS PRN
Status: DISCONTINUED | OUTPATIENT
Start: 2024-04-21 | End: 2024-04-23 | Stop reason: HOSPADM

## 2024-04-21 RX ORDER — MORPHINE SULFATE 4 MG/ML
4 INJECTION, SOLUTION INTRAMUSCULAR; INTRAVENOUS ONCE
Status: COMPLETED | OUTPATIENT
Start: 2024-04-21 | End: 2024-04-21

## 2024-04-21 RX ORDER — SODIUM CHLORIDE 0.9 % (FLUSH) 0.9 %
5-40 SYRINGE (ML) INJECTION PRN
Status: DISCONTINUED | OUTPATIENT
Start: 2024-04-21 | End: 2024-04-23 | Stop reason: HOSPADM

## 2024-04-21 RX ORDER — SODIUM CHLORIDE 9 MG/ML
INJECTION, SOLUTION INTRAVENOUS PRN
Status: DISCONTINUED | OUTPATIENT
Start: 2024-04-21 | End: 2024-04-23 | Stop reason: HOSPADM

## 2024-04-21 RX ORDER — CARVEDILOL 3.12 MG/1
6.25 TABLET ORAL 2 TIMES DAILY WITH MEALS
Status: DISCONTINUED | OUTPATIENT
Start: 2024-04-21 | End: 2024-04-21

## 2024-04-21 RX ADMIN — MORPHINE SULFATE 4 MG: 4 INJECTION INTRAVENOUS at 17:12

## 2024-04-21 RX ADMIN — HYDROMORPHONE HYDROCHLORIDE 1 MG: 1 INJECTION, SOLUTION INTRAMUSCULAR; INTRAVENOUS; SUBCUTANEOUS at 18:09

## 2024-04-21 RX ADMIN — MORPHINE SULFATE 4 MG: 4 INJECTION INTRAVENOUS at 13:48

## 2024-04-21 RX ADMIN — SODIUM CHLORIDE, PRESERVATIVE FREE 10 ML: 5 INJECTION INTRAVENOUS at 20:15

## 2024-04-21 RX ADMIN — MORPHINE SULFATE 15 MG: 15 TABLET ORAL at 20:16

## 2024-04-21 RX ADMIN — HYDROMORPHONE HYDROCHLORIDE 1 MG: 1 INJECTION, SOLUTION INTRAMUSCULAR; INTRAVENOUS; SUBCUTANEOUS at 21:34

## 2024-04-21 ASSESSMENT — PAIN DESCRIPTION - FREQUENCY: FREQUENCY: CONTINUOUS

## 2024-04-21 ASSESSMENT — PAIN DESCRIPTION - ONSET: ONSET: ON-GOING

## 2024-04-21 ASSESSMENT — PAIN DESCRIPTION - ORIENTATION
ORIENTATION: RIGHT
ORIENTATION: RIGHT

## 2024-04-21 ASSESSMENT — PAIN SCALES - GENERAL
PAINLEVEL_OUTOF10: 10
PAINLEVEL_OUTOF10: 7
PAINLEVEL_OUTOF10: 2

## 2024-04-21 ASSESSMENT — PAIN - FUNCTIONAL ASSESSMENT: PAIN_FUNCTIONAL_ASSESSMENT: 0-10

## 2024-04-21 ASSESSMENT — PAIN DESCRIPTION - PAIN TYPE: TYPE: CHRONIC PAIN

## 2024-04-21 ASSESSMENT — PAIN DESCRIPTION - LOCATION
LOCATION: KNEE
LOCATION: KNEE

## 2024-04-21 NOTE — ED PROVIDER NOTES
[address]  EMERGENCY DEPARTMENT ENCOUNTER      Pt Name: Donnie Sadler  MRN: [id number]  Birthdate 1955  Date of evaluation: 4/21/2024  Provider: URMILA Cabrera - CNP    CHIEF COMPLAINT       Chief Complaint   Patient presents with    Knee Pain     Hx arthritis Lt knee - hx getting out of shower in Jan/Feb and heard knee \"pop\" - being seen by ortho Dr. Howell and getting knee drained x 3 the most recent this past Friday and cortisone shot.         HISTORY OF PRESENT ILLNESS   (Location/Symptom, Timing/Onset, Context/Setting, Quality, Duration, Modifying Factors, Severity)  Note limiting factors.   Donnie Sadler is a 69 y.o. male who presents to the emergency department treatment of left knee pain care for self.  Patient states 11 days ago he made the decision to stop hemodialysis.  He states that he does not have any quality of life so this decision was made.  He states he does have an appointment with hospice tomorrow at 230.  He states he did visit hospice 2-3 times, but he was not sure when the right time was to join.  He states that he does not want any life-saving measures performed.  He does not want any testing just pain control. He request to sign DNR CC paperwork.  His friend Marina Shields is at bedside.  Patient states he has a friend who he wants to make power of  Caio Ruiz.  [phone].  Patient lives alone.  He can no longer care for himself.  He states he is very weak and now cannot tolerate the pain in the left knee.  He does not want any testing performed other than assistance with the discomfort in the left knee.  He would like to us to call hospice today to see if he can get established as a patient.     States he was unable to sleep last night due to the discomfort in his left knee despite being on oral morphine.  He states that he recently on 4/19/24 was seen by Dr. Howell for the left knee pain and had the knee drained and injected with steroids.  He states that the

## 2024-04-21 NOTE — ED NOTES
Assisted in repositioning in bed - co pain to leg and all over; medication for pain. Friend/family at bedside.

## 2024-04-21 NOTE — ED NOTES
Britni - Hospice informs writer and ED midlevel that pt is approved at this time for home care hospice setting - consent and approval obtained but unable to \"fully admit\" until pt gets to his place of service which was decided at this  time to be the brother's house. But Hospice will not be able to get out to brother's house until Tuesday at 10am. Pt is concerned for pain management; pt also approved for hospital bed but it probably not be able to be delivered to brother's house until Monday per Hospice rep Britni. Hospice provides a written Evaluation Communication form. ED provider obtaining DNRCC status and form signed.

## 2024-04-21 NOTE — ED NOTES
Britni from [facility] arrives to ED - quick update provided. In to talk w/ pt, brother and female friend

## 2024-04-21 NOTE — H&P
Collection Time: 04/21/24  1:17 PM    Specimen: Urine   Result Value Ref Range    Color, UA Yellow Yellow    Turbidity UA Clear Clear    Glucose, Ur TRACE (A) NEGATIVE mg/dL    Bilirubin Urine NEGATIVE NEGATIVE    Ketones, Urine NEGATIVE NEGATIVE mg/dL    Specific Gravity, UA 1.020 1.005 - 1.030    Urine Hgb MODERATE (A) NEGATIVE    pH, UA 6.0 5.0 - 8.0    Protein, UA 2+ (A) NEGATIVE mg/dL    Urobilinogen, Urine Normal 0.0 - 1.0 EU/dL    Nitrite, Urine NEGATIVE NEGATIVE    Leukocyte Esterase, Urine NEGATIVE NEGATIVE   Microscopic Urinalysis    Collection Time: 04/21/24  1:17 PM   Result Value Ref Range    WBC, UA 0 TO 2 0 - 5 /HPF    RBC, UA 2 TO 5 0 - 2 /HPF    Epithelial Cells UA 0 TO 2 0 - 5 /HPF    Bacteria, UA FEW (A) None    Other Observations UA (A) NOT REQ.     Utilizing a urinalysis as the only screening method to exclude a potential uropathogen can be unreliable in many patient populations.  Rapid screening tests are less sensitive than culture and if UTI is a clinical possibility, culture should be considered despite a negative urinalysis.         Imaging/Diagnostics:  No results found.    Assessment :      Hospital Problems             Last Modified POA    * (Principal) Admission for end of life care 4/21/2024 Yes    End-stage renal disease (HCC) 4/21/2024 Yes       Plan:     ESRD   -The patient stopped dialysis 11-days-ago and states that he does not wish to receive any further treatment   -The patient has requested that labs not be drawn   -The patient does not wish to receive any treatments and would like Hospice services only   -Dilaudid pain panel   -PRN lorazepam   -Hospice is following; anticipate discharge home with Hospice on Tuesday   -Code status is DNR-CC     Patient is admitted as observation status. Expected length of stay < 48 hours. Patient is admitted in the Med/Surge    Everette Silverman MD  4/21/2024  5:06 PM    Copy sent to Dr. Purcell, Emile Iraheta MD

## 2024-04-21 NOTE — ED NOTES
Assist x 1 in sitting at bedside to void per urinal - voiding 225ml clear yellow urine via urinal - specimen to lab. Assisted back to bed. Call light w/in reach

## 2024-04-21 NOTE — ED PROVIDER NOTES
[facility] Emergency Department  [address]  [place] [address]  Phone: [phone]  Fax: [phone]      Attending Physician Attestation          CHIEF COMPLAINT       Chief Complaint   Patient presents with    Knee Pain     Hx arthritis Lt knee - hx getting out of shower in Jan/Feb and heard knee \"pop\" - being seen by ortho Dr. Howell and getting knee drained x 3 the most recent this past Friday and cortisone shot.       DIAGNOSTIC RESULTS     LABS:  Labs Reviewed   URINALYSIS WITH REFLEX TO CULTURE       All other labs were within normal range or not returned as of this dictation.    RADIOLOGY:  No orders to display         EMERGENCY DEPARTMENT COURSE:   Vitals:    Vitals:    04/21/24 0948 04/21/24 0951 04/21/24 1157   BP: (!) 184/140 (!) 185/140 (!) 186/112   Pulse: 91     Resp: 16     Temp: 97.7 °F (36.5 °C)     TempSrc: Oral     SpO2: 94%     Weight: 83.9 kg (185 lb)     Height: 1.753 m (5' 9\")       -------------------------  BP: (!) 186/112, Temp: 97.7 °F (36.5 °C), Pulse: 91, Respirations: 16      ED Course as of 04/21/24 1304   Sun Apr 21, 2024   1158 Awaiting Hospice to come to ED to evaluate patient. Friend Marina Shields at bedside.  [AR]      ED Course User Index  [AR] Malu Matthew, APRN - CNP         PERTINENT ATTENDING PHYSICIAN COMMENTS:    I performed a history and physical examination of the patient and discussed management with the mid level provider. I reviewed the mid level provider's note and agree with the documented findings and plan of care. Any areas of disagreement are noted on the chart. I was personally present for the key portions of any procedures. I have documented in the chart those procedures where I was not present during the key portions. I have reviewed the emergency nurses triage note. I agree with the chief complaint, past medical history, past surgical history, allergies, medications, social and family history as documented unless

## 2024-04-21 NOTE — ED NOTES
Female friend accompanying pt; pt very weakened state - friend states pt lives alone and not fully able to take care of self alone at home and she has a concern with that - assist x 2 required to get pt from  to stretcher. Pt awake/alert but very fatigued.  Pt telling writer that he chose to stop his dialysis treatments 2 weeks ago as he has stage 5 kidney disease. Fistula to left arm present.  ED mid-level coming out of room short time later telling writer that pt has been in hospice before and asking for hospice again. Hospice notified to come speak to patient. Pt resting quietly at this time -

## 2024-04-22 LAB
ALBUMIN SERPL-MCNC: 3.9 G/DL (ref 3.5–5.2)
ALBUMIN/GLOB SERPL: 1.3 {RATIO} (ref 1–2.5)
ALP SERPL-CCNC: 84 U/L (ref 40–129)
ALT SERPL-CCNC: 15 U/L (ref 5–41)
ANION GAP SERPL CALCULATED.3IONS-SCNC: 21 MMOL/L (ref 9–17)
AST SERPL-CCNC: 13 U/L
BILIRUB SERPL-MCNC: 0.4 MG/DL (ref 0.3–1.2)
BUN SERPL-MCNC: 147 MG/DL (ref 8–23)
CALCIUM SERPL-MCNC: 10.1 MG/DL (ref 8.6–10.4)
CHLORIDE SERPL-SCNC: 113 MMOL/L (ref 98–107)
CO2 SERPL-SCNC: 10 MMOL/L (ref 20–31)
CREAT SERPL-MCNC: 10.4 MG/DL (ref 0.7–1.2)
GFR SERPL CREATININE-BSD FRML MDRD: 5 ML/MIN/1.73M2
GLUCOSE SERPL-MCNC: 152 MG/DL (ref 70–99)
POTASSIUM SERPL-SCNC: 5.7 MMOL/L (ref 3.7–5.3)
PROT SERPL-MCNC: 6.8 G/DL (ref 6.4–8.3)
SODIUM SERPL-SCNC: 144 MMOL/L (ref 135–144)

## 2024-04-22 PROCEDURE — 6360000002 HC RX W HCPCS: Performed by: STUDENT IN AN ORGANIZED HEALTH CARE EDUCATION/TRAINING PROGRAM

## 2024-04-22 PROCEDURE — 99231 SBSQ HOSP IP/OBS SF/LOW 25: CPT | Performed by: STUDENT IN AN ORGANIZED HEALTH CARE EDUCATION/TRAINING PROGRAM

## 2024-04-22 PROCEDURE — 96376 TX/PRO/DX INJ SAME DRUG ADON: CPT

## 2024-04-22 PROCEDURE — 2580000003 HC RX 258: Performed by: STUDENT IN AN ORGANIZED HEALTH CARE EDUCATION/TRAINING PROGRAM

## 2024-04-22 PROCEDURE — 36415 COLL VENOUS BLD VENIPUNCTURE: CPT

## 2024-04-22 PROCEDURE — G0378 HOSPITAL OBSERVATION PER HR: HCPCS

## 2024-04-22 PROCEDURE — 80053 COMPREHEN METABOLIC PANEL: CPT

## 2024-04-22 PROCEDURE — 6370000000 HC RX 637 (ALT 250 FOR IP): Performed by: STUDENT IN AN ORGANIZED HEALTH CARE EDUCATION/TRAINING PROGRAM

## 2024-04-22 RX ADMIN — HYDROMORPHONE HYDROCHLORIDE 1 MG: 1 INJECTION, SOLUTION INTRAMUSCULAR; INTRAVENOUS; SUBCUTANEOUS at 08:23

## 2024-04-22 RX ADMIN — ONDANSETRON 4 MG: 4 TABLET, ORALLY DISINTEGRATING ORAL at 03:46

## 2024-04-22 RX ADMIN — SODIUM CHLORIDE, PRESERVATIVE FREE 10 ML: 5 INJECTION INTRAVENOUS at 20:50

## 2024-04-22 RX ADMIN — MORPHINE SULFATE 15 MG: 15 TABLET ORAL at 10:23

## 2024-04-22 RX ADMIN — SODIUM CHLORIDE, PRESERVATIVE FREE 10 ML: 5 INJECTION INTRAVENOUS at 08:22

## 2024-04-22 RX ADMIN — MORPHINE SULFATE 15 MG: 15 TABLET ORAL at 20:49

## 2024-04-22 RX ADMIN — HYDROMORPHONE HYDROCHLORIDE 1 MG: 1 INJECTION, SOLUTION INTRAMUSCULAR; INTRAVENOUS; SUBCUTANEOUS at 21:49

## 2024-04-22 ASSESSMENT — PAIN DESCRIPTION - LOCATION
LOCATION: GENERALIZED;KNEE
LOCATION: KNEE
LOCATION: GENERALIZED;KNEE
LOCATION: KNEE

## 2024-04-22 ASSESSMENT — PAIN SCALES - GENERAL
PAINLEVEL_OUTOF10: 6
PAINLEVEL_OUTOF10: 10
PAINLEVEL_OUTOF10: 10
PAINLEVEL_OUTOF10: 6
PAINLEVEL_OUTOF10: 6
PAINLEVEL_OUTOF10: 7
PAINLEVEL_OUTOF10: 7

## 2024-04-22 ASSESSMENT — PAIN DESCRIPTION - ORIENTATION
ORIENTATION: RIGHT
ORIENTATION: LEFT
ORIENTATION: LEFT
ORIENTATION: RIGHT

## 2024-04-22 ASSESSMENT — PAIN DESCRIPTION - DESCRIPTORS
DESCRIPTORS: SHARP
DESCRIPTORS: STABBING
DESCRIPTORS: STABBING
DESCRIPTORS: SHARP

## 2024-04-22 NOTE — DISCHARGE INSTR - COC
Continuity of Care Form    Patient Name: Donnie Sadler   :  1955  MRN:  [id number]    Admit date:  2024  Discharge date:  ***    Code Status Order: DNR-CC   Advance Directives:     Admitting Physician:  Everette Silverman MD  PCP: Emile Purcell MD    Discharging Nurse: ***  Discharging Hospital Unit/Room#: 314/314-01  Discharging Unit Phone Number: ***    Emergency Contact:   Extended Emergency Contact Information  Primary Emergency Contact: Diann Duke  Home Phone: [phone]  Mobile Phone: [phone]  Relation: Other  Preferred language: English   needed? No  Secondary Emergency Contact: Naa Zepeda   [facility]  Home Phone: [phone]  Relation: Brother/Sister    Past Surgical History:  Past Surgical History:   Procedure Laterality Date    DIALYSIS FISTULA CREATION      PROSTATE SURGERY      TONSILLECTOMY      VASECTOMY         Immunization History:   Immunization History   Administered Date(s) Administered    COVID-19, PFIZER PURPLE top, DILUTE for use, (age 12 y+), 30mcg/0.3mL 2021, 2021    TDaP, ADACEL (age 10y-64y), BOOSTRIX (age 10y+), IM, 0.5mL 2018    Td vaccine (adult) 2004       Active Problems:  Patient Active Problem List   Diagnosis Code    Heart failure, unspecified ([facility]) I50.9    Anemia in chronic kidney disease N18.9, D63.1    Anaphylactic reaction due to adverse effect of correct drug or medicament properly administered, initial encounter T88.6XXA    Atrial flutter ([facility]) I48.92    Chest pain R07.9    Coagulation defect, unspecified ([facility]) D68.9    Complication of vascular dialysis catheter T82.9XXA    Compression of vein I87.1    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

## 2024-04-22 NOTE — PROGRESS NOTES
SPIRITUAL CARE DEPARTMENT - [facility]  PROGRESS NOTE    Room # 314/314-01   Name: Donnie Sadler            Spiritism: Quaker     Reason for visit:  Nurse referred/Hospice    I visited the patient.    Admit Date & Time: 4/21/2024  9:45 AM    Assessment:  Donnie Sadler is a 69 y.o. male in the hospital because \"end of life care\". Upon entering the room pt was lying in bed. Pt's nurse was attending to pt. Pt shared \"I have been ready to \"go home\" for a long time now\" Pt shared about his relationship with God, and his rubin. Pt appeared to be calm, coping, and hopeful at this time.      Intervention:  I introduced myself and my title as  I offered space for patient  to express feelings, needs, and concerns and provided a ministry presence. Writer nurtured pt's sense of hope. Writer actively listened to pt and offered words of affirmation.     Outcome:  Pt thanked writer for this  visit     Plan:  Chaplains will remain available to offer spiritual and emotional support as needed.    Electronically signed by Chaplain Alex, on 4/22/2024 at 1:32 PM.  Spiritual Care Department  [facility] -        04/22/24 1330   Encounter Summary   Encounter Overview/Reason  Grief, Loss, and Adjustments   Service Provided For: Patient   Referral/Consult From: Nurse   Support System Friends/neighbors;Family members   Last Encounter  04/22/24   Complexity of Encounter Low   Begin Time 1245   End Time  1250   Total Time Calculated 5 min   Spiritual/Emotional needs   Type Spiritual Support   Grief, Loss, and Adjustments   Type Hospice   Assessment/Intervention/Outcome   Assessment Calm;Coping   Intervention Nurtured Hope;Discussed belief system/Yarsanism practices/rubin;Discussed relationship with God;Active listening;Sustaining Presence/Ministry of presence   Outcome Engaged in conversation;Expressed feelings, needs, and concerns;Coping;Expressed Gratitude;Peace   Plan and Referrals   Plan/Referrals

## 2024-04-22 NOTE — PLAN OF CARE
Problem: Discharge Planning  Goal: Discharge to home or other facility with appropriate resources  Outcome: Progressing  Flowsheets (Taken 4/22/2024 0417)  Discharge to home or other facility with appropriate resources:   Identify barriers to discharge with patient and caregiver   Arrange for needed discharge resources and transportation as appropriate   Identify discharge learning needs (meds, wound care, etc)     Problem: Pain  Goal: Verbalizes/displays adequate comfort level or baseline comfort level  Outcome: Progressing  Flowsheets (Taken 4/22/2024 0417)  Verbalizes/displays adequate comfort level or baseline comfort level:   Encourage patient to monitor pain and request assistance   Assess pain using appropriate pain scale   Administer analgesics based on type and severity of pain and evaluate response   Implement non-pharmacological measures as appropriate and evaluate response     Problem: Safety - Adult  Goal: Free from fall injury  Outcome: Progressing  Flowsheets (Taken 4/22/2024 0417)  Free From Fall Injury: Instruct family/caregiver on patient safety

## 2024-04-22 NOTE — PLAN OF CARE
Problem: Discharge Planning  Goal: Discharge to home or other facility with appropriate resources  4/22/2024 1522 by Diann Guevara RN  Outcome: Progressing     Problem: Pain  Goal: Verbalizes/displays adequate comfort level or baseline comfort level  4/22/2024 1522 by Diann Guevara RN  Outcome: Progressing     Problem: Safety - Adult  Goal: Free from fall injury  4/22/2024 1522 by Diann Guevara RN  Outcome: Progressing     Problem: Skin/Tissue Integrity  Goal: Absence of new skin breakdown  Description: 1.  Monitor for areas of redness and/or skin breakdown  2.  Assess vascular access sites hourly  3.  Every 4-6 hours minimum:  Change oxygen saturation probe site  4.  Every 4-6 hours:  If on nasal continuous positive airway pressure, respiratory therapy assess nares and determine need for appliance change or resting period.  Outcome: Progressing

## 2024-04-22 NOTE — PROGRESS NOTES
[facility]  Office: [phone]  Nasir Stephens DO, Zion Vigil DO, Roberto Griffin DO, Mani Watkins DO, Catarina Coburn MD, Shruthi Mike MD, Austin Basurto MD, Ashley Baum MD,  Angel Sheriff MD, Estefani Casper MD, Haider Anderson MD,  Diamond Lucas DO, Ye Suárez MD, Everette Silverman MD, Sergio Stephens DO, Karen Barrera MD,  Gino Hughes DO, Mariana Edwards MD, Candice Llamas MD, Beatriz Beebe MD, Lupe Proctor MD,  Jeff Beverly MD, Ashley Vargas MD, aCssia Bender MD, Brittney De Dios MD, Jose Sue MD, Pamela Miller MD, Prosper Frazier DO, Pelon Joshi DO, Kayleen Acuña MD,  João Schmid MD, Shirley Waterhouse, CNP,  Devi Fowler CNP, Bi Lozada, CNP,  Renetta Rodriguez, DNP, Kacy Christine, CNP, Colleen Barrow, CNP, Cristina Mcneil CNP, Rosi Morfin, CNP, Lorraine Roldan, PA-C, Janine Reyes PA-C, Cait Ortiz, CNP, Sultana Mccauley, CNP, Manolo Mazariegos, CNP, Antonina Lorenzo, CNP, Melissa Kelley, CNP, Dee Ramos, CNS, Teresa Flores, CNP, Beatriz Nick CNP, Tracy Schwab, CNP         [facility]   IN-PATIENT SERVICE   [facility]    Progress Note    4/22/2024    7:43 AM    Name:   Donnie Sadler  MRN:     [id number]     Acct:      [id number]   Room:   314/314-01   Day:  0  Admit Date:  4/21/2024  9:45 AM    PCP:   Emile Purcell MD  Code Status:  DNR-CC    Subjective:     Patient was seen and examined at bedside this AM. He states that he was severely confused and did not know where he was last night and this is undoubtedly due to uremia. Awaiting Hospice arrangements. Recommend inpatient Hospice.     Medications:     Allergies:    Allergies   Allergen Reactions    Latex Rash     Other reaction(s): RED INFLAMED  Other reaction(s): RED INFLAMED    Other reaction(s): Unknown    Chlorhexidine Itching     Other reaction(s): burning    Sulfa Antibiotics Anaphylaxis, Hives, Other (See Comments) and Nausea And Vomiting    Chlorhexidine Gluconate

## 2024-04-23 VITALS
SYSTOLIC BLOOD PRESSURE: 193 MMHG | HEART RATE: 77 BPM | WEIGHT: 185 LBS | TEMPERATURE: 97.5 F | OXYGEN SATURATION: 100 % | BODY MASS INDEX: 27.4 KG/M2 | HEIGHT: 69 IN | RESPIRATION RATE: 18 BRPM | DIASTOLIC BLOOD PRESSURE: 125 MMHG

## 2024-04-23 PROCEDURE — G0378 HOSPITAL OBSERVATION PER HR: HCPCS

## 2024-04-23 PROCEDURE — 96376 TX/PRO/DX INJ SAME DRUG ADON: CPT

## 2024-04-23 PROCEDURE — 2580000003 HC RX 258: Performed by: STUDENT IN AN ORGANIZED HEALTH CARE EDUCATION/TRAINING PROGRAM

## 2024-04-23 PROCEDURE — 96375 TX/PRO/DX INJ NEW DRUG ADDON: CPT

## 2024-04-23 PROCEDURE — 6370000000 HC RX 637 (ALT 250 FOR IP): Performed by: STUDENT IN AN ORGANIZED HEALTH CARE EDUCATION/TRAINING PROGRAM

## 2024-04-23 PROCEDURE — 6360000002 HC RX W HCPCS: Performed by: STUDENT IN AN ORGANIZED HEALTH CARE EDUCATION/TRAINING PROGRAM

## 2024-04-23 PROCEDURE — 99238 HOSP IP/OBS DSCHRG MGMT 30/<: CPT | Performed by: STUDENT IN AN ORGANIZED HEALTH CARE EDUCATION/TRAINING PROGRAM

## 2024-04-23 RX ADMIN — SODIUM CHLORIDE, PRESERVATIVE FREE 10 ML: 5 INJECTION INTRAVENOUS at 08:00

## 2024-04-23 RX ADMIN — ONDANSETRON 4 MG: 2 INJECTION INTRAMUSCULAR; INTRAVENOUS at 07:59

## 2024-04-23 RX ADMIN — SODIUM CHLORIDE, PRESERVATIVE FREE 10 ML: 5 INJECTION INTRAVENOUS at 11:10

## 2024-04-23 RX ADMIN — HYDROMORPHONE HYDROCHLORIDE 1 MG: 1 INJECTION, SOLUTION INTRAMUSCULAR; INTRAVENOUS; SUBCUTANEOUS at 11:09

## 2024-04-23 RX ADMIN — HYDROMORPHONE HYDROCHLORIDE 1 MG: 1 INJECTION, SOLUTION INTRAMUSCULAR; INTRAVENOUS; SUBCUTANEOUS at 00:49

## 2024-04-23 RX ADMIN — MORPHINE SULFATE 15 MG: 15 TABLET ORAL at 09:38

## 2024-04-23 RX ADMIN — HYDROMORPHONE HYDROCHLORIDE 1 MG: 1 INJECTION, SOLUTION INTRAMUSCULAR; INTRAVENOUS; SUBCUTANEOUS at 06:24

## 2024-04-23 ASSESSMENT — PAIN DESCRIPTION - LOCATION
LOCATION: KNEE

## 2024-04-23 ASSESSMENT — PAIN SCALES - GENERAL
PAINLEVEL_OUTOF10: 8
PAINLEVEL_OUTOF10: 7
PAINLEVEL_OUTOF10: 10
PAINLEVEL_OUTOF10: 7
PAINLEVEL_OUTOF10: 10

## 2024-04-23 ASSESSMENT — PAIN DESCRIPTION - ORIENTATION
ORIENTATION: LEFT

## 2024-04-23 ASSESSMENT — PAIN DESCRIPTION - DESCRIPTORS
DESCRIPTORS: STABBING
DESCRIPTORS: SHARP
DESCRIPTORS: STABBING

## 2024-04-23 NOTE — PLAN OF CARE
Problem: Discharge Planning  Goal: Discharge to home or other facility with appropriate resources  4/23/2024 0131 by Antonina Austin, RN  Outcome: Progressing  Flowsheets (Taken 4/22/2024 2000)  Discharge to home or other facility with appropriate resources: Identify barriers to discharge with patient and caregiver     Problem: Pain  Goal: Verbalizes/displays adequate comfort level or baseline comfort level  4/23/2024 0131 by Antonina Austin, RN  Outcome: Progressing   NO NEW SIGNS OR SYMPTOMS OF PAIN NOTED, PAIN RATING <3 ON SCALE OF 0-10 THIS SHIFT. PAIN CONTROLLED WITH MEDICATION AND REPOSITIONING.  Problem: Safety - Adult  Goal: Free from fall injury  4/23/2024 0131 by Antonina Austin, RN  Outcome: Progressing   NO FALLS OR INJURIES THIS SHIFT, BED IN LOWEST POSITION, BRAKES ON, BED ALARM ON, CALL LIGHT IN REACH, SIDE RAILS UP X2.  Problem: Skin/Tissue Integrity  Goal: Absence of new skin breakdown  Description: 1.  Monitor for areas of redness and/or skin breakdown  2.  Assess vascular access sites hourly  3.  Every 4-6 hours minimum:  Change oxygen saturation probe site  4.  Every 4-6 hours:  If on nasal continuous positive airway pressure, respiratory therapy assess nares and determine need for appliance change or resting period.  4/23/2024 0131 by Antonina Austin RN  Outcome: Progressing

## 2024-04-23 NOTE — PROGRESS NOTES
SPIRITUAL CARE DEPARTMENT - [facility]  PROGRESS NOTE    Room # 314/314-01   Name: Donnie Sadler            Sabianist: Scientology      Reason for visit: Advanced Directives Consult    I visited the patient.    Admit Date & Time: 4/21/2024  9:45 AM    Assessment:  Donnie Sadler is a 69 y.o. male in the hospital because \"admission\". Upon entering the room pt was lying in bed. Pt's family and friends were also present in room. Pt and pt's family explained to this writer that \"patient's \" was present with patient and family earlier this day and completed ACP paperwork with patient. Pt's friends and family also shared that they will make sure that pt's nurse is given copy of completed ACP paperwork.       Intervention:  I introduced myself and my title as  I offered space for patient  to express feelings, needs, and concerns and provided a ministry presence.      Outcome:  Pt and family thanked writer for visit     Plan:  Chaplains will remain available to offer spiritual and emotional support as needed.    Electronically signed by Chaplain Alex, on 4/23/2024 at 10:20 AM.  Spiritual Care Department  [facility]       04/23/24 1019   Encounter Summary   Encounter Overview/Reason  Advance Care Planning   Service Provided For: Patient and family together   Referral/Consult From: Nurse   Support System Friends/neighbors;Family members   Last Encounter  04/23/24   Complexity of Encounter Low   Begin Time 1000   End Time  1005   Total Time Calculated 5 min   Advance Care Planning   Type ACP conversation   Assessment/Intervention/Outcome   Assessment Calm;Coping   Intervention Active listening;Sustaining Presence/Ministry of presence   Outcome Engaged in conversation;Expressed Gratitude;Coping   Plan and Referrals   Plan/Referrals Continue Support (comment)

## 2024-04-23 NOTE — DISCHARGE SUMMARY
[facility]  Office: [phone]  Nasir Stephens DO, Zion Vigil DO, Roberto Griffin DO, Mani Watkins DO, Catarina Coburn MD, Shruthi Mike MD, Austin Basurto MD, Ashley Baum MD,  Angel Sheriff MD, Estefani Casper MD, Haider Anderson MD,  Diamond Lucas DO, Ye Suárez MD, Everette Silverman MD, Sergio Stephens DO, Karen Barrera MD,  Gino Hughes DO, Mariana Edwards MD, Candice Llamas MD, Beatriz Beebe MD, Lupe Proctor MD,  Jeff Beverly MD, Ashley Vargas MD, Cassia Bender MD, Brittney De Dios MD, Jose Sue MD, Pamela Miller MD, Prosper Frazier DO, Pelon Joshi DO, Kayleen Acuña MD,  João Schmid MD, Shirley Waterhouse, CNP,  Devi Fowler CNP, Bi Lozada, CNP,  Renetta Rodriguez, DNP, Kacy Christine, CNP, Colleen Barrow, CNP, Cristina Mcneil CNP, Rosi Morfin, CNP, Lorraine Roldan, PA-C, Janine Reyse PA-C, Cait Ortiz, CNP, Sultana Mccauley, CNP, Manolo Mazariegos, CNP, Antonina Lorenzo, CNP, Melissa Kelley, CNP, Dee Ramos, CNS, Teresa Flores, CNP, Beatriz Nick CNP, Tracy Schwab, CNP         [facility]   IN-PATIENT SERVICE   [facility]    Discharge Summary     Patient ID: Donnie Sadler  :  1955   MRN: [id number]     ACCOUNT:  [id number]   Patient's PCP: Emile Purcell MD  Admit Date: 2024   Discharge Date: 2024  Length of Stay: 0  Code Status:  DNR-CC  Admitting Physician: Everette Silverman MD  Discharge Physician: Everette Silverman MD     Active Discharge Diagnoses:     Hospital Problem Lists:  Principal Problem:    Admission for end of life care  Active Problems:    End-stage renal disease (HCC)  Resolved Problems:    * No resolved hospital problems. *      Admission Condition:  poor     Discharged Condition: poor    Hospital Stay:     Hospital Course:  Donnie Sadler is a 69 y.o. male with a past medical history of ESRD who presented to the emergency department on 2024 complaining of generalized pain. The patient has ESRD and

## 2024-04-23 NOTE — CARE COORDINATION
Writer spoke Zamzam, Hospice  for  Dr. Maris Loaiza regarding inpatient status patient declined  because he did not meet GIP for  Medicare. Dr. Carver declined as symptoms  can be managed at home.  Hospice has requested additional medical records through pcp,for further review per request by family and Diann,friend.  Zamzam will follow up with Dr. Loaiza and provide updates,  awaiting call back.       1445 Writer met with Diann,friend, Caio Ruiz,friend and Xavier Brother,updated hospice declined, Provided details for [facility] price appx [phone] per month for ehanced  with 30day commitment and 30day notice to terminate.  Facility utilizes [facility] per Andreina, .  Offered details on respite care through [facility] @ 250/day with 14day commitment if extends would be month by month.      1502 Rec'd call from Teresa Pereyra [phone], inpatient services [facility] they can accept patient for in patient hospice.  Mariah HAGAN, hospice will meet and finalize details tomorrow at 930am with admission to [facility]. Patient in agreement.    1520 Writer left message with Uzair rincon regarding POA , awaiting call back.   
Writer spoke with Harini MCMILLAN she is following up on providers intent to change to inpatient status, writer will follow up with Dr. Silverman.       1010 Writer spoke with Dr. Silverman patient will remain observation status.      1015: Writer spoke with [facility] patient requesting to transport by private auto ,bed availablility @ 330pm, pt to discharge at 3pm.    
current housing: end of live hospice care   Potential Assistance needed at discharge: Other (Comment) ([facility] Living,  Respite Care)            Potential DME:    Patient expects to discharge to: Hospice (comment)  Plan for transportation at discharge: Family    Financial    Payor: MEDICARE / Plan: MEDICARE PART A AND B / Product Type: *No Product type* /     Does insurance require precert for SNF: no    Potential assistance Purchasing Medications:    Meds-to-Beds request:        [facility], [place] - [address] [facility] [phone] - F [phone]  [address] [facility] [address]  Phone: [phone] Fax: [phone]      Notes:    Factors facilitating achievement of predicted outcomes: Family support, Cooperative, and Pleasant    Barriers to discharge: Pain, Confusion, Limited safety awareness, Decreased endurance, Medical complications, and Medication managment    Additional Case Management Notes: Goal is inpatient [facility], has been declined for inpatient services, Xavier, brother  is unable to provide care at home. Hospice is continuing to review case.   Assisted living with [facility] being considered family has reached out to facility,. Talked with family about respite care this too is a consideration.   Will continue to follow.     The Plan for Transition of Care is related to the following treatment goals of Admission for end of life care [Z51.5]  Chronic pain of left knee [M25.562, G89.29]  Encounter for admission to hospice care [Z51.5]    IF APPLICABLE: The Patient and/or patient representative Donnie and his family were provided with a choice of provider and agrees with the discharge plan. Freedom of choice list with basic dialogue that supports the patient's individualized plan of care/goals and shares the quality data associated with the providers was provided to:     Patient Representative Name:       The Patient and/or Patient Representative Agree with the